# Patient Record
Sex: FEMALE | Race: WHITE | NOT HISPANIC OR LATINO | URBAN - NONMETROPOLITAN AREA
[De-identification: names, ages, dates, MRNs, and addresses within clinical notes are randomized per-mention and may not be internally consistent; named-entity substitution may affect disease eponyms.]

---

## 2020-01-31 ENCOUNTER — IMPORTED ENCOUNTER (OUTPATIENT)
Dept: URBAN - NONMETROPOLITAN AREA CLINIC 1 | Facility: CLINIC | Age: 65
End: 2020-01-31

## 2020-01-31 PROBLEM — H10.013: Noted: 2020-01-31

## 2020-01-31 PROCEDURE — 92012 INTRM OPH EXAM EST PATIENT: CPT

## 2020-01-31 NOTE — PATIENT DISCUSSION
Conjunctivitis OU  ReslovingDiscussed w/ patient  Start Maxitrol gtts qid ou x 10 days- sent to pharmacy for patientRTC prn

## 2022-04-09 ASSESSMENT — VISUAL ACUITY
OS_PH: 20/25-1
OS_CC: 20/30-1
OD_CC: 20/25-2

## 2022-04-09 ASSESSMENT — TONOMETRY
OS_IOP_MMHG: 12
OD_IOP_MMHG: 12